# Patient Record
(demographics unavailable — no encounter records)

---

## 2025-05-19 NOTE — HISTORY OF PRESENT ILLNESS
[FreeTextEntry1] : establish care  [de-identified] : May 19, 2025   20 year  old male  presents to establish care was previously seeing :           Dr Weinberg                 will ask for medical records to be sent over    - brings in paperwork enlisting in [employer]  PMH:  Denying any pertinent medical history  PSH: dental  Social hx: never a smoker, denying any drug use -  Fam hx:    mom -  Denying any pertinent medical history     dad- DM, HTN , cardiac bypass few months ago   11 siblings-  Denying any pertinent medical history   denying known fam hx of cancer-   Allergies: NKDA  Meds:  none Supplements: none    Health Maintenance: Immunizations:  tdap 2016   covid- UTD

## 2025-05-19 NOTE — PLAN
declines [FreeTextEntry1] : All problems, medications and allergies were assessed and reviewed with the patient. The patients' blood was drawn in office and will be followed and evaluated for any issues. Patient was told to notify the office if any issues arise. Patient agreeable with plan  ate today

## 2025-05-19 NOTE — HEALTH RISK ASSESSMENT
[Yes] : Yes [Monthly or less (1 pt)] : Monthly or less (1 point) [1 or 2 (0 pts)] : 1 or 2 (0 points) [Never (0 pts)] : Never (0 points) [No] : In the past 12 months have you used drugs other than those required for medical reasons? No [None] : None [With Family] : lives with family [Student] : student [Single] : single [Feels Safe at Home] : Feels safe at home [Fully functional (bathing, dressing, toileting, transferring, walking, feeding)] : Fully functional (bathing, dressing, toileting, transferring, walking, feeding) [Fully functional (using the telephone, shopping, preparing meals, housekeeping, doing laundry, using] : Fully functional and needs no help or supervision to perform IADLs (using the telephone, shopping, preparing meals, housekeeping, doing laundry, using transportation, managing medications and managing finances) [Smoke Detector] : smoke detector [Carbon Monoxide Detector] : carbon monoxide detector [Safety elements used in home] : safety elements used in home [Seat Belt] :  uses seat belt [Never] : Never [Good] : ~his/her~  mood as  good [0] : 2) Feeling down, depressed, or hopeless: Not at all (0) [PHQ-2 Negative - No further assessment needed] : PHQ-2 Negative - No further assessment needed [Time Spent: ___ Minutes] : I spent [unfilled] minutes performing a depression screening for this patient. [de-identified] : denying  [de-identified] : rare [de-identified] : active-  [de-identified] : regular-  [[id number]] : 0 [HIV test declined] : HIV test declined [Hepatitis C test declined] : Hepatitis C test declined [Change in mental status noted] : No change in mental status noted [Language] : denies difficulty with language [Sexually Active] : not sexually active [High Risk Behavior] : no high risk behavior [Reports changes in hearing] : Reports no changes in hearing [Reports changes in dental health] : Reports no changes in dental health [ColonoscopyComments] : denying FH  [de-identified] : chaparro carrillo- wears contacts- recent updated script  [de-identified] : denying known skin issues-  [FreeTextEntry4] : EC : [facility]- mother